# Patient Record
Sex: MALE | Race: ASIAN | NOT HISPANIC OR LATINO | Employment: FULL TIME | URBAN - METROPOLITAN AREA
[De-identification: names, ages, dates, MRNs, and addresses within clinical notes are randomized per-mention and may not be internally consistent; named-entity substitution may affect disease eponyms.]

---

## 2020-09-10 ENCOUNTER — APPOINTMENT (OUTPATIENT)
Dept: ULTRASOUND IMAGING | Facility: CLINIC | Age: 55
End: 2020-09-10
Attending: EMERGENCY MEDICINE
Payer: COMMERCIAL

## 2020-09-10 ENCOUNTER — HOSPITAL ENCOUNTER (EMERGENCY)
Facility: CLINIC | Age: 55
Discharge: HOME | End: 2020-09-10
Attending: EMERGENCY MEDICINE | Admitting: EMERGENCY MEDICINE
Payer: COMMERCIAL

## 2020-09-10 VITALS
WEIGHT: 225 LBS | SYSTOLIC BLOOD PRESSURE: 121 MMHG | HEIGHT: 69 IN | HEART RATE: 68 BPM | OXYGEN SATURATION: 90 % | RESPIRATION RATE: 20 BRPM | TEMPERATURE: 98.9 F | DIASTOLIC BLOOD PRESSURE: 87 MMHG | BODY MASS INDEX: 33.33 KG/M2

## 2020-09-10 DIAGNOSIS — I82.4Z2 ACUTE DEEP VEIN THROMBOSIS (DVT) OF DISTAL VEIN OF LEFT LOWER EXTREMITY (H): ICD-10-CM

## 2020-09-10 DIAGNOSIS — N28.9 RENAL INSUFFICIENCY: ICD-10-CM

## 2020-09-10 DIAGNOSIS — U07.1 2019 NOVEL CORONAVIRUS DISEASE (COVID-19): ICD-10-CM

## 2020-09-10 DIAGNOSIS — J96.01 ACUTE RESPIRATORY FAILURE WITH HYPOXIA (H): ICD-10-CM

## 2020-09-10 LAB
ALBUMIN SERPL-MCNC: 2.5 G/DL (ref 3.4–5)
ALP SERPL-CCNC: 135 U/L (ref 40–150)
ALT SERPL W P-5'-P-CCNC: 81 U/L (ref 0–70)
ANION GAP SERPL CALCULATED.3IONS-SCNC: 8 MMOL/L (ref 3–14)
AST SERPL W P-5'-P-CCNC: 77 U/L (ref 0–45)
BASE DEFICIT BLDV-SCNC: 0.6 MMOL/L
BASOPHILS # BLD AUTO: 0 10E9/L (ref 0–0.2)
BASOPHILS NFR BLD AUTO: 0 %
BILIRUB SERPL-MCNC: 0.8 MG/DL (ref 0.2–1.3)
BUN SERPL-MCNC: 35 MG/DL (ref 7–30)
CALCIUM SERPL-MCNC: 8.3 MG/DL (ref 8.5–10.1)
CHLORIDE SERPL-SCNC: 101 MMOL/L (ref 94–109)
CO2 SERPL-SCNC: 24 MMOL/L (ref 20–32)
CREAT SERPL-MCNC: 2.16 MG/DL (ref 0.66–1.25)
CRP SERPL-MCNC: 249 MG/L (ref 0–8)
D DIMER PPP FEU-MCNC: 11.4 UG/ML FEU (ref 0–0.5)
DIFFERENTIAL METHOD BLD: ABNORMAL
EOSINOPHIL # BLD AUTO: 0 10E9/L (ref 0–0.7)
EOSINOPHIL NFR BLD AUTO: 0 %
ERYTHROCYTE [DISTWIDTH] IN BLOOD BY AUTOMATED COUNT: 12.7 % (ref 10–15)
GFR SERPL CREATININE-BSD FRML MDRD: 34 ML/MIN/{1.73_M2}
GLUCOSE SERPL-MCNC: 146 MG/DL (ref 70–99)
HCO3 BLDV-SCNC: 25 MMOL/L (ref 21–28)
HCT VFR BLD AUTO: 38.3 % (ref 40–53)
HGB BLD-MCNC: 13.2 G/DL (ref 13.3–17.7)
LACTATE BLD-SCNC: 1.9 MMOL/L (ref 0.7–2)
LYMPHOCYTES # BLD AUTO: 1 10E9/L (ref 0.8–5.3)
LYMPHOCYTES NFR BLD AUTO: 11 %
MCH RBC QN AUTO: 29.9 PG (ref 26.5–33)
MCHC RBC AUTO-ENTMCNC: 34.5 G/DL (ref 31.5–36.5)
MCV RBC AUTO: 87 FL (ref 78–100)
METAMYELOCYTES # BLD: 0.1 10E9/L
METAMYELOCYTES NFR BLD MANUAL: 1 %
MONOCYTES # BLD AUTO: 0.3 10E9/L (ref 0–1.3)
MONOCYTES NFR BLD AUTO: 3 %
NEUTROPHILS # BLD AUTO: 7.9 10E9/L (ref 1.6–8.3)
NEUTROPHILS NFR BLD AUTO: 85 %
O2/TOTAL GAS SETTING VFR VENT: ABNORMAL %
OXYHGB MFR BLDV: 32 %
PCO2 BLDV: 41 MM HG (ref 40–50)
PH BLDV: 7.38 PH (ref 7.32–7.43)
PLATELET # BLD AUTO: 248 10E9/L (ref 150–450)
PLATELET # BLD EST: ABNORMAL 10*3/UL
PO2 BLDV: 22 MM HG (ref 25–47)
POTASSIUM SERPL-SCNC: 4.4 MMOL/L (ref 3.4–5.3)
PROCALCITONIN SERPL-MCNC: 0.46 NG/ML
PROT SERPL-MCNC: 7.3 G/DL (ref 6.8–8.8)
RBC # BLD AUTO: 4.41 10E12/L (ref 4.4–5.9)
RBC MORPH BLD: ABNORMAL
SODIUM SERPL-SCNC: 133 MMOL/L (ref 133–144)
TROPONIN I SERPL-MCNC: 0.04 UG/L (ref 0–0.04)
WBC # BLD AUTO: 9.3 10E9/L (ref 4–11)

## 2020-09-10 PROCEDURE — 96375 TX/PRO/DX INJ NEW DRUG ADDON: CPT

## 2020-09-10 PROCEDURE — 85025 COMPLETE CBC W/AUTO DIFF WBC: CPT | Performed by: EMERGENCY MEDICINE

## 2020-09-10 PROCEDURE — 85379 FIBRIN DEGRADATION QUANT: CPT | Performed by: EMERGENCY MEDICINE

## 2020-09-10 PROCEDURE — 99285 EMERGENCY DEPT VISIT HI MDM: CPT | Mod: 25

## 2020-09-10 PROCEDURE — 83605 ASSAY OF LACTIC ACID: CPT | Performed by: EMERGENCY MEDICINE

## 2020-09-10 PROCEDURE — 86140 C-REACTIVE PROTEIN: CPT | Performed by: EMERGENCY MEDICINE

## 2020-09-10 PROCEDURE — 82805 BLOOD GASES W/O2 SATURATION: CPT | Performed by: EMERGENCY MEDICINE

## 2020-09-10 PROCEDURE — 87040 BLOOD CULTURE FOR BACTERIA: CPT | Performed by: EMERGENCY MEDICINE

## 2020-09-10 PROCEDURE — 84484 ASSAY OF TROPONIN QUANT: CPT | Performed by: EMERGENCY MEDICINE

## 2020-09-10 PROCEDURE — 25000128 H RX IP 250 OP 636: Performed by: EMERGENCY MEDICINE

## 2020-09-10 PROCEDURE — 80053 COMPREHEN METABOLIC PANEL: CPT | Performed by: EMERGENCY MEDICINE

## 2020-09-10 PROCEDURE — 93970 EXTREMITY STUDY: CPT

## 2020-09-10 PROCEDURE — 84145 PROCALCITONIN (PCT): CPT | Performed by: EMERGENCY MEDICINE

## 2020-09-10 PROCEDURE — 96374 THER/PROPH/DIAG INJ IV PUSH: CPT

## 2020-09-10 PROCEDURE — 93005 ELECTROCARDIOGRAM TRACING: CPT

## 2020-09-10 RX ORDER — HEPARIN SODIUM 10000 [USP'U]/100ML
1500 INJECTION, SOLUTION INTRAVENOUS CONTINUOUS
Status: DISCONTINUED | OUTPATIENT
Start: 2020-09-10 | End: 2020-09-10 | Stop reason: HOSPADM

## 2020-09-10 RX ORDER — DEXAMETHASONE SODIUM PHOSPHATE 10 MG/ML
10 INJECTION, SOLUTION INTRAMUSCULAR; INTRAVENOUS ONCE
Status: COMPLETED | OUTPATIENT
Start: 2020-09-10 | End: 2020-09-10

## 2020-09-10 RX ADMIN — DEXAMETHASONE SODIUM PHOSPHATE 10 MG: 10 INJECTION, SOLUTION INTRAMUSCULAR; INTRAVENOUS at 15:33

## 2020-09-10 RX ADMIN — HEPARIN SODIUM 1500 UNITS/HR: 10000 INJECTION, SOLUTION INTRAVENOUS at 17:30

## 2020-09-10 RX ADMIN — Medication 6700 UNITS: at 17:30

## 2020-09-10 NOTE — ED PROVIDER NOTES
History   Chief Complaint:  Cough    HPI  Kai Reeves is a 55 year old male with a history of type II diabetes, hypertension, and hyperlipidemia who presents for evaluation of a persistent cough in the setting of hypoxia at home. Ten days ago, the patient reports the onset of fevers, body aches, and loss of taste and smell. At that time, he was swabbed for COVID-19 (8/31) which did come back positive. He has been using Tessalon pearls, Mucinex, an albuterol inhaler, and Robitussin AC, however the cough has persisted.  He was also continuing to have fevers despite regular Tylenol, so yesterday he contacted his primary clinic requesting an x-ray; he reports this revealed bilateral opacities and he was started on azithromycin empirically. Today, he found himself to be hypoxic to 88% on a home pulse oximeter, thus prompting presentation to the ED.  Here, he reports his fevers broke yesterday and his cough and shortness of breath have persisted but are not worse today than yesterday.  He also reports normalization of his sense of smell and taste, and denies any diarrhea, sore throat, appetite change, or other symptoms.     From Outside Encounter - 9/9/2020:   XR Chest 2 views:   Two views were obtained. There are patchy bilateral airspace opacities which given the provided history are compatible with COVID-19 pneumonia. No pleural effusion or pneumothorax. Normal cardiac silhouette and mediastinal contours. No evidence of an acute osseous abnormality, as per radiology.     Allergies:  Chlorthalidone     Medications:    Amlodipine   Metoprolol  Losartan   Metformin   Atorvastatin   Albuterol inhaler  Zithromax     Past Medical History:    Type II diabetes  Dyslipidemia   Hypertension   Chronic idiopathic gout   Focal segmental glomerulosclerosis   CKD, stage 3     Past Surgical History:    Vasectomy     Family History:    No past pertinent family history.     Social History:  Marital Status: .  [employer]  "office.  Wife recently had similar symptoms and also tested positive, though she has since recovered.  Wife drove him to the ED.   Positive for occasional cigar use.   Positive alcohol use. Comment: occasional.     Review of Systems   All other systems reviewed and are negative.    Physical Exam     Patient Vitals for the past 24 hrs:   BP Temp Temp src Pulse Resp SpO2 Height Weight   09/10/20 1700 -- -- -- -- -- -- 1.753 m (5' 9\") --   09/10/20 1630 -- -- -- -- -- 91 % -- --   09/10/20 1615 -- -- -- -- -- 90 % -- --   09/10/20 1600 111/75 -- -- 73 -- 90 % -- --   09/10/20 1545 -- -- -- -- -- 90 % -- --   09/10/20 1530 -- -- -- -- -- 94 % -- --   09/10/20 1529 -- -- -- -- -- 92 % -- --   09/10/20 1515 -- -- -- -- -- 93 % -- --   09/10/20 1505 -- -- -- -- -- 92 % -- --   09/10/20 1445 -- -- -- -- -- (!) 88 % -- --   09/10/20 1430 107/74 -- -- 73 -- (!) 89 % -- --   09/10/20 1400 116/82 -- -- 76 -- 92 % -- --   09/10/20 1337 -- 98.9  F (37.2  C) Oral -- 20 -- -- 102.1 kg (225 lb)   09/10/20 1329 -- -- -- -- -- 90 % -- --   09/10/20 1327 -- -- -- -- -- (!) 84 % -- --   09/10/20 1326 113/85 -- -- 80 -- -- -- --        Physical Exam  General: Male semirecumbent in room 3  HENT: mucous membranes moist  CV: rate as above, regular rhythm, no lower extremity edema  Resp: Hypoxic at 84% on room air, low to mid 90s on 5 L nasal cannula initially.  No stridor, occasional dry cough observed, speaks in full phrases.  GI: abdomen soft and nontender, no guarding  MSK: no bony tenderness  Skin: appropriately warm and dry  Neuro: alert, clear speech, oriented  Psych: cooperative    Emergency Department Course     ECG:  Indication: Shortness of Breath  Time: 1406  Vent. Rate 75 bpm. LA interval 184. QRS duration 86. QT/QTc 366/408. P-R-T axis 30 10 3.    Normal sinus rhythm.  Normal ECG. Read time: 1422.    Imaging:   X-ray from Outside Encounter on 9/9/2020 reviewed and included above.     US Lower Extremity Venous Duplex, " bilateral:  Pending    Laboratory:  Laboratory findings were communicated with the patient who voiced understanding of the findings.  CBC: WBC: 9.3, HGB: 13.2 (L), PLT: 248  CMP: Glucose 146 (H), BUN: 35 (H), Creatinine: 2.16 (H), GFR: 34 (L), Ca: 8.3 (L), Albumin: 2.5 (L), ALT: 81 (H), AST: 77 (H), o/w WNL     1336 Troponin: 0.041   Lactic acid: 1.9    D dimer: pending  CRP: pending    Procalcitonin: 0.46    Blood gas venous and oxyhgb: pO2: 22 (L), o/w WNL     Blood cultures x2: Pending     Interventions:   1533 Dexamethasone, 10 mg, IV injection     Emergency Department Course:  Nursing notes and vitals reviewed.   1340: I obtained a history from the patient via remote iPad per COVID-19 PUI protocol; in this conversation, I discussed likely transfer to [facility] given his known COVID-19 positive status. Following this, I performed an exam of the patient as documented above.      Patient placed on continuous cardiac and pulse ox monitoring. Patient is on 5 L FlO2 per NC in the ED.     IV was inserted and blood was drawn for laboratory testing, results above. Medicine administered as documented above.   EKG obtained in the ED, see results above.      1435: I consulted with Dr. Pisano, hospitalist at [facility], regarding the patient's history and presentation here in the emergency department. She accepts the patient for admission with the request that we obtain a bilateral lower extremity US, as well as additional lab work, here in the ED prior to transfer.     1445: Per RN, the patient's oxygen saturations started dropping to the upper 80s while on 5 L NC; she changed him to an oxymask with 6 L O2 and the patient's saturations improved.     The patient was sent for a lower extremity US while in the emergency department, results above.      1511: I spoke with Dr. Pisano again and provided an update on the patient's oxygen status as he has been switched from NC to an oxy-mask; she will plan to keep  "patient inpatient status.  Clarified dose of dex - 10mg IV.    1527: Per RN, the patient is de-satting on 7 L however the patient reports he only feels comfortable laying on his side.  His brief desaturation was after getting up and going to the bathroom.  Nurse reassessed him, placed him in prone position, and adjusted his oxygen mask, he is now 99% on 6 L oxygen mask.  He continues to state he feels improved from arrival, and that he has more energy.  I reexamined him and he has no respiratory distress.    Findings and plan explained to the Patient. Patient will be transferred to [facility] via EMS. Discussed the case with Dr. Pisano, who will admit the patient to a monitored bed for further monitoring, evaluation, and treatment.    I personally reviewed the laboratory results with the Patient and answered all related questions prior to transfer.    1650 ultrasound has been read, and shows occlusive thrombus in the left lower extremity.  I spoke with patient and updated him on this finding and the corresponding recommendation to initiate anticoagulation.  He has no history of GI or CNS bleed.  EMS transport has been unfortunately delayed as the ambulance that had been on the way to pick him up got diverted for a 911 call.   I notified patient of this delay as well. I called Radiologist (Dr. Park, 17:18) to clarify US reading (read as \"muscular vein thrombosis) - she confirms this is a DVT.  Dr. Dale in ED updated as well.    Impression & Plan      Covid-19  Kai Reeves was evaluated during a global COVID-19 pandemic, which necessitated consideration that the patient might be at risk for infection with the SARS-CoV-2 virus that causes COVID-19.   Applicable protocols for evaluation were followed during the patient's care.   COVID-19 was considered as part of the patient's evaluation. The plan for testing is:  a test was obtained at a previous visit and reviewed & considered today.    Medical Decision Making: "   His recent clinical scenario is highly consistent with COVID-19 illness.  Unfortunately he has now developed hypoxia, and for this reason he requires hospitalization.  He has a corresponding cough, and I think his hypoxia is likely due to COVID-19, with note made that he did have an abnormal chest x-ray yesterday.  In the absence of interval change in symptoms, I did not feel that there was clinical utility in repeating this on such a short interval.  Nothing he requires CT imaging to consider PE at this time, acknowledging he is likely at increased risk in light of his known COVID-19.  At the time of transfer, he is on 6 L oxygen with oxygen saturations in the mid to high 90s.  He does not have increased respiratory effort and does not appear to be tiring, and in fact actually states he feels notably better than upon arrival here.  I spoke several times with the hospitalist excepting patients to [place], for dedicated COVID-19 care, he was accepted there.  Additional labs and ultrasound imaging were ordered at their request.  I do not think he requires empiric antibiotics.  He was given dexamethasone after discussion with accepting hospitalist.  He is full code.  EMS transport was apparently due at 1600, and has not yet here though should arrive shortly.  Dr. Dale in ED aware of patient in case of change in status while awaiting EMS transfer.    Diagnosis:     ICD-10-CM    1. Acute respiratory failure with hypoxia (H)  J96.01 Comprehensive metabolic panel     CBC with platelets differential     Troponin I     Blood gas venous and oxyhgb     Blood culture     Blood culture     Lactic acid     Procalcitonin     CRP inflammation     CRP inflammation     D dimer quantitative     D dimer quantitative     CANCELED: D dimer quantitative     CANCELED: CRP inflammation   2. 2019 novel coronavirus disease (COVID-19)  U07.1    3. Renal insufficiency  N28.9    4. Acute deep vein thrombosis (DVT) of distal vein of left lower  extremity (H)  I82.4Z2        Disposition:   Transferred to [place] via EMS.    This note was completed in part using Dragon voice recognition software. Although reviewed after completion, some word and grammatical errors may occur.     Scribe Disclosure:  I, Radha Miller, am serving as a scribe on 9/10/2020 at 2:27 PM to personally document services performed by Josh Banks MD based on my observations and the provider's statements to me.       EMERGENCY DEPARTMENT     Josh Banks MD  09/10/20 2196       Josh Banks MD  09/10/20 [id number]

## 2020-09-10 NOTE — ED TRIAGE NOTES
Cough 3-4 days, xray done yesterday-dx with double pneumonia. Home O2 is in the 80s, advised to come here for further eval.

## 2020-09-11 LAB — INTERPRETATION ECG - MUSE: NORMAL

## 2020-09-16 ENCOUNTER — AMBULATORY - HEALTHEAST (OUTPATIENT)
Dept: CARE COORDINATION | Facility: CLINIC | Age: 55
End: 2020-09-16

## 2020-09-16 ENCOUNTER — COMMUNICATION - HEALTHEAST (OUTPATIENT)
Dept: NURSING | Facility: CLINIC | Age: 55
End: 2020-09-16

## 2020-09-16 DIAGNOSIS — U07.1 2019 NOVEL CORONAVIRUS DISEASE (COVID-19): ICD-10-CM

## 2020-09-16 LAB
BACTERIA SPEC CULT: NO GROWTH
BACTERIA SPEC CULT: NO GROWTH
SPECIMEN SOURCE: NORMAL
SPECIMEN SOURCE: NORMAL

## 2021-06-11 NOTE — PROGRESS NOTES
CHW spoke to patient and his wife Franci today- Patient states he is doing very well. CHW provided patient with nephrologist that he saw while in the hospital as patient was very pleased with his service  Sammy Rodriguez    CHW removed referral as services were declined      Clinic Care Coordination Contact  [facility]       Clinical Data: Care Coordinator Outreach  Outreach attempted x 1. CHW attempted to reach patient today to discuss recent [facility] referral for d/c and PCP follow up- No answer or vm options todfay Care Coordinator will try to reach patient again in 1-2 business days.  9/18/2020

## 2021-06-16 PROBLEM — U07.1 ACUTE RESPIRATORY FAILURE DUE TO COVID-19 (H): Status: ACTIVE | Noted: 2020-09-10

## 2021-06-16 PROBLEM — J96.00 ACUTE RESPIRATORY FAILURE DUE TO COVID-19 (H): Status: ACTIVE | Noted: 2020-09-10
